# Patient Record
Sex: FEMALE | Race: WHITE | NOT HISPANIC OR LATINO | Employment: UNEMPLOYED | URBAN - METROPOLITAN AREA
[De-identification: names, ages, dates, MRNs, and addresses within clinical notes are randomized per-mention and may not be internally consistent; named-entity substitution may affect disease eponyms.]

---

## 2021-10-06 RX ORDER — LACTULOSE 10 G/15ML
30 SOLUTION ORAL; RECTAL
COMMUNITY
End: 2022-05-12

## 2022-02-14 PROBLEM — N97.9 INFERTILITY, FEMALE: Status: ACTIVE | Noted: 2022-02-14

## 2023-01-03 ENCOUNTER — TELEPHONE (OUTPATIENT)
Dept: OBSTETRICS AND GYNECOLOGY | Facility: CLINIC | Age: 38
End: 2023-01-03
Payer: COMMERCIAL

## 2023-01-03 NOTE — TELEPHONE ENCOUNTER
Called patient and she got a message to accept earlier time from wait list.  Patient did not accept earlier time through portal and appointment was never scheduled.  Patient called to ask if she could bring her child to appointment instead of accepting through portal.  No sooner appointment available.  Patient will keep appointment as scheduled.

## 2023-01-03 NOTE — TELEPHONE ENCOUNTER
----- Message from Amara Guerra sent at 1/3/2023  9:19 AM CST -----  Pt received a notification to schedule a later appt the same day. Please call her back to confirm the time. Call back number is [phone]. Thx. EL

## 2023-01-31 ENCOUNTER — OFFICE VISIT (OUTPATIENT)
Dept: OBSTETRICS AND GYNECOLOGY | Facility: CLINIC | Age: 38
End: 2023-01-31
Payer: COMMERCIAL

## 2023-01-31 VITALS
WEIGHT: 138.69 LBS | SYSTOLIC BLOOD PRESSURE: 92 MMHG | HEIGHT: 61 IN | DIASTOLIC BLOOD PRESSURE: 60 MMHG | BODY MASS INDEX: 26.19 KG/M2

## 2023-01-31 DIAGNOSIS — N80.9 ENDOMETRIOSIS DETERMINED BY LAPAROSCOPY: ICD-10-CM

## 2023-01-31 DIAGNOSIS — N94.6 DYSMENORRHEA: ICD-10-CM

## 2023-01-31 DIAGNOSIS — N97.9 INFERTILITY, FEMALE: Primary | ICD-10-CM

## 2023-01-31 PROCEDURE — 3078F PR MOST RECENT DIASTOLIC BLOOD PRESSURE < 80 MM HG: ICD-10-PCS | Mod: CPTII,S$GLB,, | Performed by: OBSTETRICS & GYNECOLOGY

## 2023-01-31 PROCEDURE — 99999 PR PBB SHADOW E&M-EST. PATIENT-LVL III: CPT | Mod: PBBFAC,,, | Performed by: OBSTETRICS & GYNECOLOGY

## 2023-01-31 PROCEDURE — 1159F MED LIST DOCD IN RCRD: CPT | Mod: CPTII,S$GLB,, | Performed by: OBSTETRICS & GYNECOLOGY

## 2023-01-31 PROCEDURE — 99204 OFFICE O/P NEW MOD 45 MIN: CPT | Mod: S$GLB,,, | Performed by: OBSTETRICS & GYNECOLOGY

## 2023-01-31 PROCEDURE — 1160F PR REVIEW ALL MEDS BY PRESCRIBER/CLIN PHARMACIST DOCUMENTED: ICD-10-PCS | Mod: CPTII,S$GLB,, | Performed by: OBSTETRICS & GYNECOLOGY

## 2023-01-31 PROCEDURE — 3074F PR MOST RECENT SYSTOLIC BLOOD PRESSURE < 130 MM HG: ICD-10-PCS | Mod: CPTII,S$GLB,, | Performed by: OBSTETRICS & GYNECOLOGY

## 2023-01-31 PROCEDURE — 99204 PR OFFICE/OUTPT VISIT, NEW, LEVL IV, 45-59 MIN: ICD-10-PCS | Mod: S$GLB,,, | Performed by: OBSTETRICS & GYNECOLOGY

## 2023-01-31 PROCEDURE — 3008F PR BODY MASS INDEX (BMI) DOCUMENTED: ICD-10-PCS | Mod: CPTII,S$GLB,, | Performed by: OBSTETRICS & GYNECOLOGY

## 2023-01-31 PROCEDURE — 3078F DIAST BP <80 MM HG: CPT | Mod: CPTII,S$GLB,, | Performed by: OBSTETRICS & GYNECOLOGY

## 2023-01-31 PROCEDURE — 99999 PR PBB SHADOW E&M-EST. PATIENT-LVL III: ICD-10-PCS | Mod: PBBFAC,,, | Performed by: OBSTETRICS & GYNECOLOGY

## 2023-01-31 PROCEDURE — 1159F PR MEDICATION LIST DOCUMENTED IN MEDICAL RECORD: ICD-10-PCS | Mod: CPTII,S$GLB,, | Performed by: OBSTETRICS & GYNECOLOGY

## 2023-01-31 PROCEDURE — 3074F SYST BP LT 130 MM HG: CPT | Mod: CPTII,S$GLB,, | Performed by: OBSTETRICS & GYNECOLOGY

## 2023-01-31 PROCEDURE — 3008F BODY MASS INDEX DOCD: CPT | Mod: CPTII,S$GLB,, | Performed by: OBSTETRICS & GYNECOLOGY

## 2023-01-31 PROCEDURE — 1160F RVW MEDS BY RX/DR IN RCRD: CPT | Mod: CPTII,S$GLB,, | Performed by: OBSTETRICS & GYNECOLOGY

## 2023-01-31 RX ORDER — MEFENAMIC ACID 250 MG/1
1 CAPSULE ORAL EVERY 6 HOURS PRN
Qty: 30 EACH | Refills: 3 | Status: SHIPPED | OUTPATIENT
Start: 2023-01-31 | End: 2023-07-13

## 2023-01-31 NOTE — PROGRESS NOTES
Subjective:       Patient ID: Joann Dent is a 37 y.o. female.    Chief Complaint:  fertility  (Menopause symptoms; cycles only 1-2 days, cramping for week before. )      History of Present Illness  HPI  Presents for second opinion regarding fertility and possible menopause.  Pt and her  have a long hx of infertility.  She has been established with Dr. Schmid and with Dr. Santizo.  Pt with known endometriosis dx'd on laparoscopy with Dr. Santizo.  She has undergone several IVF cycles, and had a successful, term pregnancy with a healthy baby boy in 2019.  Has tried IVF a few more times with no success.  She took a break because was not responding well to fertility treatments.  Pt states Dr. Santizo suggested she may have more success conceiving spontaneously.  She is now experiencing daily depression that seemed to worsen after a close friend of hers who was also doing IVF recently conceived spontaneously.  Tried zoloft, but had significant nausea with that.  Feels like the depression is related to the failed IVF cycles.  Pt has monthly periods, but they only last 1 day.  Has increased cramps the week prior to her period.  Pt did have hot flushes while doing injections for IVF cycles, but no longer having them.  PCP recently checked several labs.  FSH 10.9 and LH 39.2, both drawn during the luteal phase on 1/10/23.  Last pap: 2021: neg    GYN & OB History  Patient's last menstrual period was 2023.   Date of Last Pap: No result found    OB History    Para Term  AB Living   2 1 1 0 1 1   SAB IAB Ectopic Multiple Live Births   1 0 0 0 1      # Outcome Date GA Lbr Fady/2nd Weight Sex Delivery Anes PTL Lv   2 Term 19    M Vag-Spont   ANATOLIY   1 SAB 2017 6w0d             Birth Comments: chemical pregnancy       Review of Systems  Review of Systems   Constitutional:  Negative for fatigue, fever and unexpected weight change.   Gastrointestinal:  Negative for abdominal pain,  constipation, diarrhea, nausea and vomiting.   Genitourinary:  Positive for menstrual problem (periods are short, only lasting 1 day) and pelvic pain (1 week prior to her period). Negative for dysuria, frequency, urgency, vaginal bleeding, vaginal discharge, vaginal pain, postcoital bleeding and vaginal odor.   Psychiatric/Behavioral:  Positive for depression (tried zoloft, but had significant nausea, so stopped taking it).          Objective:    Physical Exam:   Constitutional: She is oriented to person, place, and time. She appears well-developed and well-nourished. No distress.                           Neurological: She is alert and oriented to person, place, and time.     Psychiatric: She has a normal mood and affect. Her behavior is normal. Judgment and thought content normal.        Assessment:        1. Infertility, female    2. Endometriosis determined by laparoscopy    3. Dysmenorrhea                Plan:      Joann was seen today for fertility .    Diagnoses and all orders for this visit:    Infertility, female  -     FOLLICLE STIMULATING HORMONE; Future  -     Antimullerian hormone (AMH); Future    Endometriosis determined by laparoscopy    Dysmenorrhea  -     mefenamic acid 250 mg Cap; Take 1 tablet by mouth every 6 (six) hours as needed (pain).     Will plan to check FSH and AMH on cycle day 3 (pt will notify me via untapthart on the first day of her next period).  Explained that from a fertility standpoint, there is nothing more I can offer her given the complexity of fertility issues and treatments she has needed.  Would need to follow-up with Dr. Santizo or Dr. Schmid for that.  Uses ponstel for cramps and would like refill.   RTC in March for well-woman exam and to review day 3 FSH and AMH levels.

## 2023-02-06 ENCOUNTER — TELEPHONE (OUTPATIENT)
Dept: OBSTETRICS AND GYNECOLOGY | Facility: CLINIC | Age: 38
End: 2023-02-06
Payer: COMMERCIAL

## 2023-02-06 NOTE — TELEPHONE ENCOUNTER
----- Message from Kellie Brock sent at 2/6/2023  3:34 PM CST -----  Contact: Joann Colon is calling in regards to some blood work that was ordered went to the wrong insurance company.Please call back at [phone]          Thanks  VALERIE

## 2023-02-06 NOTE — TELEPHONE ENCOUNTER
Pt stated that her lab work that you ordered will not be covered and would like for you to change the diagnostic code to where it does not say infertility due to her insurance not coving infertility. Please advise.

## 2023-02-07 ENCOUNTER — TELEPHONE (OUTPATIENT)
Dept: OBSTETRICS AND GYNECOLOGY | Facility: CLINIC | Age: 38
End: 2023-02-07
Payer: COMMERCIAL

## 2023-02-07 ENCOUNTER — PATIENT MESSAGE (OUTPATIENT)
Dept: OBSTETRICS AND GYNECOLOGY | Facility: CLINIC | Age: 38
End: 2023-02-07
Payer: COMMERCIAL

## 2023-02-07 NOTE — TELEPHONE ENCOUNTER
----- Message from Miguel Singh sent at 2/7/2023  8:42 AM CST -----  Contact: [phone]  Type:  Patient Returning Call    Who Called:Joann   Who Left Message for Patient:nurse   Does the patient know what this is regarding?:yes   Would the patient rather a call back or a response via [employer]? Call back   Best Call Back Number:[phone]  Additional Information:

## 2023-02-21 ENCOUNTER — PATIENT MESSAGE (OUTPATIENT)
Dept: OBSTETRICS AND GYNECOLOGY | Facility: CLINIC | Age: 38
End: 2023-02-21
Payer: COMMERCIAL

## 2023-02-22 ENCOUNTER — TELEPHONE (OUTPATIENT)
Dept: OBSTETRICS AND GYNECOLOGY | Facility: HOSPITAL | Age: 38
End: 2023-02-22
Payer: COMMERCIAL

## 2023-02-22 DIAGNOSIS — N95.1 HOT FLUSHES, PERIMENOPAUSAL: ICD-10-CM

## 2023-02-22 NOTE — TELEPHONE ENCOUNTER
Okay.  We'll try a different diagnosis code based off her symptoms, but I can't guarantee these will be covered.  They need to be scheduled and drawn on Friday.  Please use today's lab orders to link to her lab visit.

## 2023-02-22 NOTE — TELEPHONE ENCOUNTER
----- Message from Usha Camejo LPN sent at 2/22/2023  9:25 AM CST -----  Good Morning,     Pt called in regards to Fertility Labs, States her cycle started today,  However her insurance will not pay for Labs using the infertility codes, would like to know if another code can be used     Tamia  ----- Message -----  From: Tiffany Rascon  Sent: 2/22/2023   9:03 AM CST  To: [facility] Staff    Please call pt @ [phone] regarding message on [employer], pt need to speak nurse

## 2023-02-22 NOTE — TELEPHONE ENCOUNTER
Contacted patient, scheduled labs for Friday with new orders linked as requested. Patient voiced understanding.

## 2023-02-24 ENCOUNTER — LAB VISIT (OUTPATIENT)
Dept: LAB | Facility: HOSPITAL | Age: 38
End: 2023-02-24
Attending: OBSTETRICS & GYNECOLOGY
Payer: COMMERCIAL

## 2023-02-24 DIAGNOSIS — N95.1 HOT FLUSHES, PERIMENOPAUSAL: ICD-10-CM

## 2023-02-24 LAB — FSH SERPL-ACNC: 8.13 MIU/ML

## 2023-02-24 PROCEDURE — 83001 ASSAY OF GONADOTROPIN (FSH): CPT | Performed by: OBSTETRICS & GYNECOLOGY

## 2023-02-24 PROCEDURE — 36415 COLL VENOUS BLD VENIPUNCTURE: CPT | Mod: PO | Performed by: OBSTETRICS & GYNECOLOGY

## 2023-02-24 PROCEDURE — 83520 IMMUNOASSAY QUANT NOS NONAB: CPT | Performed by: OBSTETRICS & GYNECOLOGY

## 2023-02-27 LAB — MIS SERPL-MCNC: 0.22 NG/ML (ref 0.15–7.5)

## 2023-07-13 PROBLEM — K58.1 IRRITABLE BOWEL SYNDROME WITH CONSTIPATION: Status: ACTIVE | Noted: 2023-07-13

## 2023-07-13 PROBLEM — K59.00 CONSTIPATION: Status: ACTIVE | Noted: 2023-07-13

## 2024-03-06 PROBLEM — R27.8 DYSSYNERGIA: Status: ACTIVE | Noted: 2024-03-06

## 2024-03-06 PROBLEM — F50.81 BINGE EATING DISORDER: Status: ACTIVE | Noted: 2024-03-06

## 2024-03-06 PROBLEM — F50.819 BINGE EATING DISORDER: Status: ACTIVE | Noted: 2024-03-06

## 2024-03-06 PROBLEM — F90.2 ATTENTION DEFICIT HYPERACTIVITY DISORDER (ADHD), COMBINED TYPE: Status: ACTIVE | Noted: 2024-03-06

## 2024-09-30 PROBLEM — F32.0 MAJOR DEPRESSIVE DISORDER, SINGLE EPISODE, MILD: Status: ACTIVE | Noted: 2024-09-30

## 2024-10-01 ENCOUNTER — PATIENT MESSAGE (OUTPATIENT)
Dept: OBSTETRICS AND GYNECOLOGY | Facility: CLINIC | Age: 39
End: 2024-10-01
Payer: COMMERCIAL